# Patient Record
(demographics unavailable — no encounter records)

---

## 2024-12-26 NOTE — HISTORY OF PRESENT ILLNESS
[Grade: ____] : Grade: [unfilled] [NO] : No [Mother] : mother [Yes] : Patient goes to dentist yearly [Needs Immunizations] : Needs immunizations [Eats meals with family] : eats meals with family [Normal Performance] : normal performance [Eats regular meals including adequate fruits and vegetables] : eats regular meals including adequate fruits and vegetables [Has friends] : has friends [Uses safety belts/safety equipment] : uses safety belts/safety equipment  [Has peer relationships free of violence] : has peer relationships free of violence [No] : Patient has not had sexual intercourse. [Has ways to cope with stress] : has ways to cope with stress [Displays self-confidence] : displays self-confidence [Has problems with sleep] : has problems with sleep [With Teen] : teen [At least 1 hour of physical activity a day] : at least 1 hour of physical activity a day [Has interests/participates in community activities/volunteers] : has interests/participates in community activities/volunteers. [Uses electronic nicotine delivery system] : does not use electronic nicotine delivery system [Exposure to electronic nicotine delivery system] : no exposure to electronic nicotine delivery system [Uses tobacco] : does not use tobacco [Exposure to tobacco] : no exposure to tobacco [Uses drugs] : does not use drugs  [Exposure to drugs] : no exposure to drugs [Drinks alcohol] : does not drink alcohol [Exposure to alcohol] : no exposure to alcohol [Impaired/distracted driving] : no impaired/distracted driving [Gets depressed, anxious, or irritable/has mood swings] : does not get depressed, anxious, or irritable/has mood swings [Has thought about hurting self or considered suicide] : has not thought about hurting self or considered suicide [de-identified] : 1 cavity repaired  [de-identified] : [employer] [de-identified] : lives with parents, younger sister, lives on campus housing during semester  [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   Since last PE a year ago, denies ER visits or hospitalizations. Going to gym on campus, trying to eat healthier food choices on campus  LMP: 11/27/2024 lasting 5 days, monthly, heavy and cramps on day 1-2 Gender identity: female Sexual orientation: heterosexual Denies sexual activity, painful urination, vaginal discharge/odor or lesions/mass

## 2024-12-26 NOTE — DISCUSSION/SUMMARY
[] : The components of the vaccine(s) to be administered today are listed in the plan of care. The disease(s) for which the vaccine(s) are intended to prevent and the risks have been discussed with the caretaker.  The risks are also included in the appropriate vaccination information statements which have been provided to the patient's caregiver.  The caregiver has given consent to vaccinate. [FreeTextEntry1] : Tory is a 18yo F with hx thalassemia minor trait here for annual physical.   POCT UCG neg  Plan: - Vaccine: Fluz[employer] - FU annually for PE

## 2024-12-26 NOTE — DISCUSSION/SUMMARY
[] : The components of the vaccine(s) to be administered today are listed in the plan of care. The disease(s) for which the vaccine(s) are intended to prevent and the risks have been discussed with the caretaker.  The risks are also included in the appropriate vaccination information statements which have been provided to the patient's caregiver.  The caregiver has given consent to vaccinate. [FreeTextEntry1] : Tory is a 20yo F with hx thalassemia minor trait here for annual physical.   POCT UCG neg  Plan: - Vaccine: Fluzone - FU annually for PE

## 2024-12-26 NOTE — HISTORY OF PRESENT ILLNESS
[Grade: ____] : Grade: [unfilled] [NO] : No [Mother] : mother [Yes] : Patient goes to dentist yearly [Needs Immunizations] : Needs immunizations [Eats meals with family] : eats meals with family [Normal Performance] : normal performance [Eats regular meals including adequate fruits and vegetables] : eats regular meals including adequate fruits and vegetables [Has friends] : has friends [Uses safety belts/safety equipment] : uses safety belts/safety equipment  [Has peer relationships free of violence] : has peer relationships free of violence [No] : Patient has not had sexual intercourse. [Has ways to cope with stress] : has ways to cope with stress [Displays self-confidence] : displays self-confidence [Has problems with sleep] : has problems with sleep [With Teen] : teen [At least 1 hour of physical activity a day] : at least 1 hour of physical activity a day [Has interests/participates in community activities/volunteers] : has interests/participates in community activities/volunteers. [Uses electronic nicotine delivery system] : does not use electronic nicotine delivery system [Exposure to electronic nicotine delivery system] : no exposure to electronic nicotine delivery system [Uses tobacco] : does not use tobacco [Exposure to tobacco] : no exposure to tobacco [Uses drugs] : does not use drugs  [Exposure to drugs] : no exposure to drugs [Drinks alcohol] : does not drink alcohol [Exposure to alcohol] : no exposure to alcohol [Impaired/distracted driving] : no impaired/distracted driving [Gets depressed, anxious, or irritable/has mood swings] : does not get depressed, anxious, or irritable/has mood swings [Has thought about hurting self or considered suicide] : has not thought about hurting self or considered suicide [de-identified] : 1 cavity repaired  [de-identified] : [employer] [de-identified] : lives with parents, younger sister, lives on campus housing during semester  [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   Since last PE a year ago, denies ER visits or hospitalizations. Going to gym on campus, trying to eat healthier food choices on campus  LMP: 11/27/2024 lasting 5 days, monthly, heavy and cramps on day 1-2 Gender identity: female Sexual orientation: heterosexual Denies sexual activity, painful urination, vaginal discharge/odor or lesions/mass